# Patient Record
Sex: FEMALE | Race: WHITE
[De-identification: names, ages, dates, MRNs, and addresses within clinical notes are randomized per-mention and may not be internally consistent; named-entity substitution may affect disease eponyms.]

---

## 2018-06-07 ENCOUNTER — HOSPITAL ENCOUNTER (OUTPATIENT)
Dept: HOSPITAL 47 - RADUSWWP | Age: 68
Discharge: HOME | End: 2018-06-07
Attending: GENERAL PRACTICE
Payer: MEDICARE

## 2018-06-07 DIAGNOSIS — E04.1: Primary | ICD-10-CM

## 2018-06-07 PROCEDURE — 76536 US EXAM OF HEAD AND NECK: CPT

## 2018-06-07 NOTE — US
EXAMINATION TYPE: US thyroid st tissue head/neck

 

DATE OF EXAM: 6/7/2018

 

COMPARISON: NONE

 

CLINICAL HISTORY: E07.9 Disorder of thyroid, unspecified.

 

GLAND SIZE:

 

Right Lobe: 3.7 x 1.2 x 1.5  cm

** Overall Parenchyma:  homogenous

Left Lobe: 3.8 x 1.1 x 1. 0 cm

** Overall Parenchyma:  homogeneous

Isthmus Thickness:  0.4 cm

 

NODULES

 

RIGHT:   # of nodules measured on right: 1

1.  0.4 X 0.3  x 0.5 cm echogenic solid nodule at the lower pole with well-defined margins; .  This n
odule is wider than tall and shows no intranodular vascularity.

** Prior size: no prior

 

LEFT:    # of nodules measured on left:  0

 

 

ISTHMUS:    # of nodules measured in the isthmus:  0

 

Bilateral neck scanned, no evidence of lymphadenopathy.

 

IMPRESSION:

No discrete thyroid abnormality. Small subcentimeter nodule is within the right lobe thyroid posterio
rly

## 2018-07-26 ENCOUNTER — HOSPITAL ENCOUNTER (OUTPATIENT)
Dept: HOSPITAL 47 - LABWHC1 | Age: 68
Discharge: HOME | End: 2018-07-26
Attending: INTERNAL MEDICINE
Payer: MEDICARE

## 2018-07-26 DIAGNOSIS — Z88.1: ICD-10-CM

## 2018-07-26 DIAGNOSIS — M25.50: Primary | ICD-10-CM

## 2018-07-26 DIAGNOSIS — Z88.2: ICD-10-CM

## 2018-07-26 DIAGNOSIS — Z88.0: ICD-10-CM

## 2018-07-26 LAB — URATE SERPL-MCNC: 5.3 MG/DL (ref 3.7–7.4)

## 2018-07-26 PROCEDURE — 86140 C-REACTIVE PROTEIN: CPT

## 2018-07-26 PROCEDURE — 82164 ANGIOTENSIN I ENZYME TEST: CPT

## 2018-07-26 PROCEDURE — 86255 FLUORESCENT ANTIBODY SCREEN: CPT

## 2018-07-26 PROCEDURE — 84550 ASSAY OF BLOOD/URIC ACID: CPT

## 2018-07-26 PROCEDURE — 36415 COLL VENOUS BLD VENIPUNCTURE: CPT

## 2018-07-26 PROCEDURE — 86431 RHEUMATOID FACTOR QUANT: CPT

## 2018-07-27 LAB
ACE SERPL-CCNC: 24 U/L (ref 8–52)
C-ANCA TITR SER: (no result) TITER
P-ANCA TITR SER IF: (no result) TITER

## 2019-07-30 ENCOUNTER — HOSPITAL ENCOUNTER (OUTPATIENT)
Dept: HOSPITAL 47 - RADUSWWP | Age: 69
Discharge: HOME | End: 2019-07-30
Attending: GENERAL PRACTICE
Payer: MEDICARE

## 2019-07-30 DIAGNOSIS — E04.1: Primary | ICD-10-CM

## 2019-07-30 PROCEDURE — 76536 US EXAM OF HEAD AND NECK: CPT

## 2019-07-30 NOTE — US
EXAMINATION TYPE: US thyroid st tissue head/neck

 

DATE OF EXAM: 7/30/2019

 

COMPARISON: NONE

 

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 Thyroid nodule

 

GLAND SIZE:

 

Right Lobe: 3.8 x 1.4 x 1.5 cm

** Overall Parenchyma:  homogenous

Left Lobe: 4.5 x 1.1 x 1.0 cm

** Overall Parenchyma:  homogeneous

Isthmus Thickness:  0.52 cm

 

NODULES

 

RIGHT:   # of nodules measured on right: 1

1.  .4 X .5  x .4 cm echogenic solid nodule at the lower pole with well-defined margins; .  This nodu
le is taller than wide and shows no intranodular vascularity.

** Prior size: .4 x  x .3  x .5 cm 

 

LEFT:    # of nodules measured on left:  0

 

ISTHMUS:    # of nodules measured in the isthmus:  0

 

Bilateral neck scanned, no evidence of lymphadenopathy.

 

 

IMPRESSION:

Stable subcentimeter right thyroid nodule measuring up to 5 mm, likely benign. No new nodules are see
n.

## 2021-10-16 ENCOUNTER — HOSPITAL ENCOUNTER (OUTPATIENT)
Dept: HOSPITAL 47 - LABWHC1 | Age: 71
Discharge: HOME | End: 2021-10-16
Attending: GENERAL PRACTICE
Payer: MEDICARE

## 2021-10-16 DIAGNOSIS — E78.5: ICD-10-CM

## 2021-10-16 DIAGNOSIS — R53.83: ICD-10-CM

## 2021-10-16 DIAGNOSIS — I10: Primary | ICD-10-CM

## 2021-10-16 LAB
ALBUMIN SERPL-MCNC: 4.3 G/DL (ref 3.8–4.9)
ALBUMIN/GLOB SERPL: 1.68 G/DL (ref 1.6–3.17)
ALP SERPL-CCNC: 63 U/L (ref 41–126)
ALT SERPL-CCNC: 15 U/L (ref 8–44)
ANION GAP SERPL CALC-SCNC: 11.7 MMOL/L (ref 4–12)
AST SERPL-CCNC: 18 U/L (ref 13–35)
BUN SERPL-SCNC: 10.7 MG/DL (ref 9–27)
BUN/CREAT SERPL: 16.36 RATIO (ref 12–20)
CALCIUM SPEC-MCNC: 9.4 MG/DL (ref 8.7–10.3)
CHLORIDE SERPL-SCNC: 102 MMOL/L (ref 96–109)
CHOLEST SERPL-MCNC: 278 MG/DL (ref 0–200)
CO2 SERPL-SCNC: 23.4 MMOL/L (ref 21.6–31.8)
ERYTHROCYTE [DISTWIDTH] IN BLOOD BY AUTOMATED COUNT: 4.39 X 10*6/UL (ref 4.1–5.2)
ERYTHROCYTE [DISTWIDTH] IN BLOOD: 13.2 % (ref 11.5–14.5)
GLOBULIN SER CALC-MCNC: 2.5 G/DL (ref 1.6–3.3)
GLUCOSE SERPL-MCNC: 102 MG/DL (ref 70–110)
HCT VFR BLD AUTO: 41.2 % (ref 37.2–46.3)
HDLC SERPL-MCNC: 59.1 MG/DL (ref 40–60)
HGB BLD-MCNC: 13.1 G/DL (ref 12–15)
LDLC SERPL CALC-MCNC: 197.3 MG/DL (ref 0–131)
MCH RBC QN AUTO: 29.8 PG (ref 27–32)
MCHC RBC AUTO-ENTMCNC: 31.8 G/DL (ref 32–37)
MCV RBC AUTO: 93.8 FL (ref 80–97)
PLATELET # BLD AUTO: 271 X 10*3/UL (ref 140–440)
POTASSIUM SERPL-SCNC: 4.2 MMOL/L (ref 3.5–5.5)
PROT SERPL-MCNC: 6.8 G/DL (ref 6.2–8.2)
SODIUM SERPL-SCNC: 137 MMOL/L (ref 135–145)
TRIGL SERPL-MCNC: 108 MG/DL (ref 0–149)
VLDLC SERPL CALC-MCNC: 21.6 MG/DL (ref 5–40)
WBC # BLD AUTO: 6.78 X 10*3/UL (ref 4.5–10)

## 2021-10-16 PROCEDURE — 80061 LIPID PANEL: CPT

## 2021-10-16 PROCEDURE — 36415 COLL VENOUS BLD VENIPUNCTURE: CPT

## 2021-10-16 PROCEDURE — 85027 COMPLETE CBC AUTOMATED: CPT

## 2021-10-16 PROCEDURE — 84443 ASSAY THYROID STIM HORMONE: CPT

## 2021-10-16 PROCEDURE — 80053 COMPREHEN METABOLIC PANEL: CPT
